# Patient Record
Sex: FEMALE | Race: WHITE | NOT HISPANIC OR LATINO | Employment: FULL TIME | URBAN - METROPOLITAN AREA
[De-identification: names, ages, dates, MRNs, and addresses within clinical notes are randomized per-mention and may not be internally consistent; named-entity substitution may affect disease eponyms.]

---

## 2024-08-26 ENCOUNTER — OFFICE VISIT (OUTPATIENT)
Dept: OBSTETRICS AND GYNECOLOGY | Facility: CLINIC | Age: 24
End: 2024-08-26
Payer: COMMERCIAL

## 2024-08-26 VITALS
BODY MASS INDEX: 45.08 KG/M2 | WEIGHT: 245 LBS | DIASTOLIC BLOOD PRESSURE: 74 MMHG | HEIGHT: 62 IN | SYSTOLIC BLOOD PRESSURE: 128 MMHG

## 2024-08-26 DIAGNOSIS — Z01.419 ENCOUNTER FOR GYNECOLOGICAL EXAMINATION WITHOUT ABNORMAL FINDING: Primary | ICD-10-CM

## 2024-08-26 DIAGNOSIS — Z87.42 HISTORY OF PCOS: ICD-10-CM

## 2024-08-26 DIAGNOSIS — Z30.41 ENCOUNTER FOR SURVEILLANCE OF CONTRACEPTIVE PILLS: ICD-10-CM

## 2024-08-26 DIAGNOSIS — E66.01 MORBID OBESITY WITH BMI OF 40.0-44.9, ADULT: ICD-10-CM

## 2024-08-26 DIAGNOSIS — Z86.39 HISTORY OF INSULIN RESISTANCE: ICD-10-CM

## 2024-08-26 PROCEDURE — 99385 PREV VISIT NEW AGE 18-39: CPT | Performed by: NURSE PRACTITIONER

## 2024-08-26 RX ORDER — ACETAMINOPHEN 500 MG
TABLET ORAL
COMMUNITY

## 2024-08-26 RX ORDER — CETIRIZINE HYDROCHLORIDE 10 MG/1
1 TABLET ORAL DAILY
COMMUNITY

## 2024-08-26 RX ORDER — OMEGA-3 FATTY ACIDS/FISH OIL 300-1000MG
CAPSULE ORAL
COMMUNITY

## 2024-08-26 RX ORDER — PROPRANOLOL HYDROCHLORIDE 20 MG/1
1-2 TABLET ORAL EVERY 8 HOURS PRN
COMMUNITY

## 2024-08-26 RX ORDER — NORGESTIMATE AND ETHINYL ESTRADIOL 0.25-0.035
1 KIT ORAL DAILY
COMMUNITY
Start: 2020-10-01

## 2024-08-26 NOTE — PROGRESS NOTES
"Chief Complaint  Caprice Rome is a 23 y.o.  female presenting for Gynecologic Exam (New GYN, establish care.  1st GYN exam. PCP prescribing birth control pills at this time.  May desire new prescription from our office when needed. /Patient told at the age of 13 that she \"might\" have PCOS.  Has been taking Metformin.  )    History of Present Illness  Caprice is a 22yo young woman, here for establishing into gynecology care.  She has no past history of any gynecologic surgeries.  Her PCP has ordered her COCPs in the past.    She was told long ago that she probably has PCOS, and she takes metformin.  She has not ever had a pelvic US.  She has no history of oligomenorrhea.    She got menarche at 13yo.  She had regular, monthly menses all through high school.  She does lane with some unwanted hair growth on her face (c/o \"peach fuzz\" on her temples, chin, upper neck).    She takes the beta blocker prn test anxiety.  (No hx of HTN or cardiac arrythmias.)  She rarely ever takes it now.  (She has completed nursing school;  works as an RN.)          The following portions of the patient's history were reviewed and updated as appropriate: allergies, current medications, past family history, past medical history, past social history, past surgical history, and problem list.    No Known Allergies      Current Outpatient Medications:     metFORMIN (GLUCOPHAGE) 500 MG tablet, Take 1 tablet by mouth Daily., Disp: , Rfl:     Sprintec 28 0.25-35 MG-MCG per tablet, Take 1 tablet by mouth Daily., Disp: , Rfl:     acetaminophen (TYLENOL) 500 MG tablet, , Disp: , Rfl:     cetirizine (zyrTEC) 10 MG tablet, Take 1 tablet by mouth Daily., Disp: , Rfl:     Ibuprofen 200 MG capsule, , Disp: , Rfl:     Melatonin 1 MG chewable tablet, , Disp: , Rfl:     propranolol (INDERAL) 20 MG tablet, Take 1-2 tablets by mouth Every 8 (Eight) Hours As Needed., Disp: , Rfl:     History reviewed. No pertinent past medical history.     Past " "Surgical History:   Procedure Laterality Date    WISDOM TOOTH EXTRACTION         Objective  /74   Ht 157.5 cm (62\")   Wt 111 kg (245 lb)   LMP 08/17/2024 (Exact Date)   Breastfeeding No   BMI 44.81 kg/m²     Physical Exam  Vitals and nursing note reviewed. Exam conducted with a chaperone present.   Constitutional:       General: She is not in acute distress.     Appearance: Normal appearance. She is not ill-appearing.   HENT:      Head: Normocephalic.   Neck:      Thyroid: No thyroid mass or thyromegaly.   Cardiovascular:      Rate and Rhythm: Normal rate and regular rhythm.      Heart sounds: Normal heart sounds. No murmur heard.  Pulmonary:      Effort: Pulmonary effort is normal. No respiratory distress.      Breath sounds: Normal breath sounds.   Chest:   Breasts:     Right: No inverted nipple, mass or nipple discharge.      Left: No inverted nipple, mass or nipple discharge.   Abdominal:      Palpations: Abdomen is soft. There is no mass.      Tenderness: There is no abdominal tenderness.   Genitourinary:     General: Normal vulva.      Labia:         Right: No rash, tenderness or lesion.         Left: No rash, tenderness or lesion.       Vagina: Normal. No erythema.      Cervix: No discharge, lesion or erythema.      Uterus: Not enlarged and not tender.       Adnexa:         Right: No mass or tenderness.          Left: No mass or tenderness.        Comments: Anus appears wnl.  No rectal exam performed.  Lymphadenopathy:      Upper Body:      Right upper body: No supraclavicular or axillary adenopathy.      Left upper body: No supraclavicular or axillary adenopathy.   Skin:     General: Skin is warm and dry.   Neurological:      Mental Status: She is alert and oriented to person, place, and time.   Psychiatric:         Mood and Affect: Mood normal.         Behavior: Behavior normal.         Assessment/Plan   Diagnoses and all orders for this visit:    1. Encounter for gynecological examination " without abnormal finding (Primary)  -     LIQUID-BASED PAP SMEAR WITH HPV GENOTYPING IF ASCUS ([facility])    2. Encounter for surveillance of contraceptive pills    3. History of PCOS    4. History of insulin resistance    5. Morbid obesity with BMI of 40.0-44.9, adult        Procedures    19 to 39: Counseling/Anticipatory Guidance Discussed: nutrition, family planning/contraception, sexual behavior and STDs, breast cancer and self breast exams, and we will be glad to order contraception in the future, if she wishes.    Return in about 1 year (around 8/26/2025) for Annual physical.    Carmina Ely, APRN  08/26/2024

## 2024-08-29 ENCOUNTER — PATIENT ROUNDING (BHMG ONLY) (OUTPATIENT)
Dept: OBSTETRICS AND GYNECOLOGY | Facility: CLINIC | Age: 24
End: 2024-08-29
Payer: COMMERCIAL

## 2024-08-29 NOTE — PROGRESS NOTES
My name is Lauryn, clinical manager    I am with [facility]  [facility]  [address]   [facility] [address] [phone]    I want to officially welcome you to our practice and ask about your recent visit.    Tell me about your visit with us.  What things went well?    We're always looking for ways to make our patients' experiences even better. Do you have recommendations on way we may improve?    Overall were you satisfied with your first visit to our practice?    I appreciate you taking the time to answer a few questions today. Is there anything else I can do for you?    Thank you, and have a great day.

## 2024-08-31 LAB — REF LAB TEST METHOD: NORMAL
